# Patient Record
Sex: MALE | Race: OTHER | URBAN - METROPOLITAN AREA
[De-identification: names, ages, dates, MRNs, and addresses within clinical notes are randomized per-mention and may not be internally consistent; named-entity substitution may affect disease eponyms.]

---

## 2017-02-28 ENCOUNTER — IMPORTED ENCOUNTER (OUTPATIENT)
Dept: URBAN - METROPOLITAN AREA CLINIC 1 | Facility: CLINIC | Age: 67
End: 2017-02-28

## 2017-02-28 PROBLEM — H40.013: Noted: 2017-02-28

## 2017-02-28 PROBLEM — H04.121: Noted: 2017-02-28

## 2017-02-28 PROBLEM — H16.142: Noted: 2017-02-28

## 2017-02-28 PROBLEM — H04.123: Noted: 2017-02-28

## 2017-02-28 PROBLEM — H04.122: Noted: 2017-02-28

## 2017-02-28 PROBLEM — H25.813: Noted: 2017-02-28

## 2017-02-28 PROBLEM — H18.413: Noted: 2017-02-28

## 2017-02-28 PROCEDURE — 92015 DETERMINE REFRACTIVE STATE: CPT

## 2017-02-28 PROCEDURE — 92014 COMPRE OPH EXAM EST PT 1/>: CPT

## 2017-02-28 NOTE — PATIENT DISCUSSION
1.  Glaucoma Suspect OU (0.8 0.8) - IOP stable. Past w/u (-) but need updated testing will repeat HVF/OCT next visit. Patient is considered Low Risk. Condition was discussed with patient and patient understands. Will continue to monitor patient for any progression in condition. Patient was advised to call us with any problems questions or concerns. 2.  Dry Eye OU w/ PEK OS - The use/continuation of artificial tears were recommended. 3.  Cataract OU: Observe for now without intervention. The patient was advised to contact us if any change or worsening of vision4. Arcus OU5. H/o HSV Keratitis OS - quite. MRX given. Return for an appointment in 6 months for 10/OCT/HVF with Dr. Claudeen Cobble.

## 2017-08-15 ENCOUNTER — IMPORTED ENCOUNTER (OUTPATIENT)
Dept: URBAN - METROPOLITAN AREA CLINIC 1 | Facility: CLINIC | Age: 67
End: 2017-08-15

## 2017-08-15 PROBLEM — H04.123: Noted: 2017-08-15

## 2017-08-15 PROBLEM — H16.143: Noted: 2017-08-15

## 2017-08-15 NOTE — PATIENT DISCUSSION
1. CLYDE w/ PEK OU OS>OD- Inserted Med Parasol Plug LLL; no complications. Silicone Plug LLL:  Risks benefits and alternatives to placing plug was discussed with patient. Patient understands and would like to proceed. Consent was signed. Post op instructions were discussed/given to patient and patient was advised to call our office if any problems arose. Use ATs QID OU Routinely. 2. Glaucoma Suspect OU (0.8 0.8) - IOP stable. Past w/u (-) but need updated testing will repeat HVF/OCT next visit. Patient is considered Low Risk. 3.  Cataract OU: Observe for now without intervention. The patient was advised to contact us if any change or worsening of vision4. Arcus OU5. H/o HSV Keratitis OS - quite. Return for an appointment in 6 mo 10 dfe glare OCT/ VF 24-2 (dilate per PMG!) with Dr. Caitlin Balbuena.

## 2017-10-11 ENCOUNTER — IMPORTED ENCOUNTER (OUTPATIENT)
Dept: URBAN - METROPOLITAN AREA CLINIC 1 | Facility: CLINIC | Age: 67
End: 2017-10-11

## 2017-10-11 PROBLEM — B00.52: Noted: 2017-10-11

## 2017-10-11 PROBLEM — H04.123: Noted: 2017-10-11

## 2017-10-11 PROCEDURE — 92012 INTRM OPH EXAM EST PATIENT: CPT

## 2017-10-11 NOTE — PATIENT DISCUSSION
1.  HSV keratitis OS - Erx Zirgan MG QHS OS until its gone. 2.  Dry Eyes OU -- REC patient to use PF AT Q1H OS AT TID OD 3. Return for an appointment in 1 week for 10. with Dr. More More.

## 2017-10-16 ENCOUNTER — IMPORTED ENCOUNTER (OUTPATIENT)
Dept: URBAN - METROPOLITAN AREA CLINIC 1 | Facility: CLINIC | Age: 67
End: 2017-10-16

## 2017-10-16 PROBLEM — H40.023: Noted: 2017-10-16

## 2017-10-16 PROBLEM — H16.143: Noted: 2017-10-16

## 2017-10-16 PROBLEM — B00.52: Noted: 2017-10-16

## 2017-10-16 PROBLEM — H04.123: Noted: 2017-10-16

## 2017-10-16 PROBLEM — H25.813: Noted: 2017-10-16

## 2017-10-16 PROCEDURE — 92012 INTRM OPH EXAM EST PATIENT: CPT

## 2017-10-16 NOTE — PATIENT DISCUSSION
1.  HSV Keratitis OS - Increase Zirgan to TID OS x5 days then d/c. Use sunglasses when exposed to UV light. Stress control. 2.  CLYDE w/ PEK OU- (h/o Plug LLL only) Cont ATs TID OU Routinely. 3.  Cataract OU: Observe for now without intervention. The patient was advised to contact us if any change or worsening of vision4. Glaucoma Suspect OU (0.8 0.8) - IOP stable. Past w/u (-) but need updated testing will repeat HVF/OCT next visit. Patient is considered Low Risk.  F/u as scheduled

## 2017-12-07 ENCOUNTER — IMPORTED ENCOUNTER (OUTPATIENT)
Dept: URBAN - METROPOLITAN AREA CLINIC 1 | Facility: CLINIC | Age: 67
End: 2017-12-07

## 2017-12-07 PROBLEM — B00.52: Noted: 2017-12-07

## 2017-12-07 PROCEDURE — 92012 INTRM OPH EXAM EST PATIENT: CPT

## 2017-12-07 NOTE — PATIENT DISCUSSION
1.  Recurrent HSV Keratitis OS - Increase Zirgan to 5x daily OS restart Valtrex 500mg po BID (Dispense 60) . Use sunglasses when exposed to UV light. Stress control. 2.  CLYDE w/ PEK OU- (h/o Plug LLL only) Cont ATs TID OU Routinely. 3.  Cataract OU: Observe for now without intervention. The patient was advised to contact us if any change or worsening of vision4. Glaucoma Suspect OU (0.8 0.8) - IOP stable. Past w/u (-) Patient is considered Low Risk. Return for an appointment in  End of next week 10 (Recheck OS) with Dr. Meron Childress.

## 2017-12-22 ENCOUNTER — IMPORTED ENCOUNTER (OUTPATIENT)
Dept: URBAN - METROPOLITAN AREA CLINIC 1 | Facility: CLINIC | Age: 67
End: 2017-12-22

## 2017-12-22 PROBLEM — B00.52: Noted: 2017-12-22

## 2017-12-22 PROCEDURE — 92012 INTRM OPH EXAM EST PATIENT: CPT

## 2017-12-22 NOTE — PATIENT DISCUSSION
1.  Recurrent HSV Keratitis OS- Improving. Decrease Zirgan OS to TID x 1 week then D/C. Decrease Valtrex 500mg po to QD. Use sunglasses when exposed to UV light. Stress control. Continue tears routinely. 2.  CLYDE w/ PEK OU- (h/o Plug LLL only) Cont ATs TID OU Routinely. 3.  Cataract OU: Observe for now without intervention. The patient was advised to contact us if any change or worsening of vision4. Glaucoma Suspect OU (0.8 0.8) - IOP stable. Past w/u (-) Patient is considered Low Risk. 5. Return as scheduled with Dr. Ricki Bowman.

## 2018-03-12 ENCOUNTER — IMPORTED ENCOUNTER (OUTPATIENT)
Dept: URBAN - METROPOLITAN AREA CLINIC 1 | Facility: CLINIC | Age: 68
End: 2018-03-12

## 2018-03-12 PROBLEM — H25.813: Noted: 2018-03-12

## 2018-03-12 PROCEDURE — 92015 DETERMINE REFRACTIVE STATE: CPT

## 2018-03-12 PROCEDURE — 92014 COMPRE OPH EXAM EST PT 1/>: CPT

## 2018-03-12 NOTE — PATIENT DISCUSSION
1.  Cataract OU:  Pt wishes to hold on Phaco. MRx for glasses given. 2.  Recurrent HSV Keratitis OS- Inactive. Observe. 3.  CLYDE w/ PEK OU- (H/o Plug LLL only) Cont ATs TID OU Routinely. 4.  Glaucoma Suspect OU (0.8 0.8) - IOP stable. Past w/u (-) Patient is considered Low Risk. Return for an appointment in 6 mo 10 VF 24-2 OU with Dr. Bubba aSlas.

## 2018-03-29 ENCOUNTER — IMPORTED ENCOUNTER (OUTPATIENT)
Dept: URBAN - METROPOLITAN AREA CLINIC 1 | Facility: CLINIC | Age: 68
End: 2018-03-29

## 2018-03-29 PROBLEM — H16.143: Noted: 2018-03-29

## 2018-03-29 PROBLEM — H04.123: Noted: 2018-03-29

## 2018-03-29 PROBLEM — B00.52: Noted: 2018-03-29

## 2018-03-29 PROCEDURE — 92012 INTRM OPH EXAM EST PATIENT: CPT

## 2018-03-29 NOTE — PATIENT DISCUSSION
1.  Recurrent HSV OS- Begin Zirgan QID OS (Rx sent to patient's pharmacy) Use PF ATs QID OU Routinely. Will recheck patient in 1 week. 2.  Cataract OU: Observe. 3.  CLYDE w/ PEK OU- (H/o Plug LLL only) Cont ATs TID OU Routinely. 4.  Glaucoma Suspect OU (0.8 0.8) - IOP stable. Past w/u (-) Patient is considered Low Risk. Return for an appointment in 1 week 10 with Dr. Mauro Fletcher.

## 2018-04-09 ENCOUNTER — IMPORTED ENCOUNTER (OUTPATIENT)
Dept: URBAN - METROPOLITAN AREA CLINIC 1 | Facility: CLINIC | Age: 68
End: 2018-04-09

## 2018-04-09 PROBLEM — B00.52: Noted: 2018-04-09

## 2018-04-09 PROCEDURE — 92012 INTRM OPH EXAM EST PATIENT: CPT

## 2018-04-09 NOTE — PATIENT DISCUSSION
1.  Recurrent HSV OS inactive- Ok to d/c Zirgan OS Cont PF ATs QID OU Routinely. 2.  Cataract OU: Observe. 3.  CLYDE w/ PEK OU- (H/o Plug LLL only) Cont ATs TID OU Routinely. 4.  Glaucoma Suspect OU (0.8 0.8) - IOP stable. Past w/u (-) Patient is considered Low Risk. Cancel May appointment Return for an appointment in 6 mo 10 Vf 24-2 OU (no OCT per pmg) with Dr. Blair Henson.

## 2018-06-18 ENCOUNTER — IMPORTED ENCOUNTER (OUTPATIENT)
Dept: URBAN - METROPOLITAN AREA CLINIC 1 | Facility: CLINIC | Age: 68
End: 2018-06-18

## 2018-06-18 PROBLEM — B00.59: Noted: 2018-06-18

## 2018-06-18 PROCEDURE — 92012 INTRM OPH EXAM EST PATIENT: CPT

## 2018-06-18 NOTE — PATIENT DISCUSSION
1.  Recurrent HSV OS: Patient to increase Zirgan OS Q2hrs. (Rx sent to pharm) Cont PF ATs QID OU Routinely2. Return for an appointment for 1wk/10 with Dr. Mauro Fletcher.

## 2018-06-26 ENCOUNTER — IMPORTED ENCOUNTER (OUTPATIENT)
Dept: URBAN - METROPOLITAN AREA CLINIC 1 | Facility: CLINIC | Age: 68
End: 2018-06-26

## 2018-06-26 PROBLEM — B00.52: Noted: 2018-06-26

## 2018-06-26 PROCEDURE — 99213 OFFICE O/P EST LOW 20 MIN: CPT

## 2018-06-26 NOTE — PATIENT DISCUSSION
1.  Recurrent HSV OS: Improving. Patient to decrease Zirgan OS to QID x 3 days then D/C. Cont PF ATs QID OU Routinely. Urged compliance w/ tear use2. Return as scheduled in October with Dr. Willis Cabrera.

## 2018-11-06 ENCOUNTER — IMPORTED ENCOUNTER (OUTPATIENT)
Dept: URBAN - METROPOLITAN AREA CLINIC 1 | Facility: CLINIC | Age: 68
End: 2018-11-06

## 2018-11-06 PROBLEM — B00.52: Noted: 2018-11-06

## 2018-11-06 PROCEDURE — 92012 INTRM OPH EXAM EST PATIENT: CPT

## 2018-11-06 NOTE — PATIENT DISCUSSION
1.  Recurrent HSV OS: Appears inactive. Increase PF ATs to Q2hrs OU Routinely. If symptoms return restart Zirgan 5x daily OS. Urged compliance w/ tear use2. Cataract OU: Observe. 3.  CLYDE w/ PEK OU- (H/o Plug LLL only) Increase PF ATs to Q2hrs OU w/a. 4.  Glaucoma Suspect OU (0.8 0.8) - IOP stable. Past w/u (-) Patient is considered Low Risk. F/u as scheduled

## 2019-01-31 ENCOUNTER — IMPORTED ENCOUNTER (OUTPATIENT)
Dept: URBAN - METROPOLITAN AREA CLINIC 1 | Facility: CLINIC | Age: 69
End: 2019-01-31

## 2019-01-31 PROBLEM — B00.52: Noted: 2019-01-31

## 2019-01-31 PROBLEM — H25.813: Noted: 2019-01-31

## 2019-01-31 PROBLEM — H40.013: Noted: 2019-01-31

## 2019-01-31 PROBLEM — H04.123: Noted: 2019-01-31

## 2019-01-31 PROBLEM — H16.143: Noted: 2019-01-31

## 2019-01-31 PROCEDURE — 92015 DETERMINE REFRACTIVE STATE: CPT

## 2019-01-31 PROCEDURE — 92012 INTRM OPH EXAM EST PATIENT: CPT

## 2019-01-31 PROCEDURE — 92083 EXTENDED VISUAL FIELD XM: CPT

## 2019-01-31 PROCEDURE — 92133 CPTRZD OPH DX IMG PST SGM ON: CPT

## 2019-01-31 NOTE — PATIENT DISCUSSION
1.  Cataract OU:  Visually Significant secondary to glare discussed the risks benefits alternatives and limitations of cataract surgery. The patient stated a full understanding and a desire to proceed with the procedure. The patient will need to return for preop appointment with cataract measurements and to have any additional questions answered and start pre-operative eye drops as directed. Phaco PCL OD then OSOtherwise follow-up in 6 months for a 30/OCT/glare2. Glaucoma Suspect OU (0.8 0.8) - IOP stable. HVF defects noted OU. Patient is considered High Risk. Repeat OCT after phaco. Condition was discussed with patient and patient understands. Will continue to monitor patient for any progression in condition. Patient was advised to call us with any problems questions or concerns. 3.  CLYDE w/ PEK OU- (H/o Plug LLL only)- Continue PF ATs Q2hrs OU w/a. 4.  Recurrent HSV OS: Appears inactive. Continue PF ATs Q2hrs OU Routinely. If symptoms return restart Zirgan 5x daily OS. Urged compliance w/ tear use5. Return for an appointment for [facility] H&P with Dr. Saqib Emerson.

## 2019-11-26 ENCOUNTER — IMPORTED ENCOUNTER (OUTPATIENT)
Dept: URBAN - METROPOLITAN AREA CLINIC 1 | Facility: CLINIC | Age: 69
End: 2019-11-26

## 2019-11-26 PROBLEM — H25.813: Noted: 2019-11-26

## 2019-11-26 PROBLEM — H40.1232: Noted: 2019-11-26

## 2019-11-26 PROCEDURE — 92133 CPTRZD OPH DX IMG PST SGM ON: CPT

## 2019-11-26 PROCEDURE — 92012 INTRM OPH EXAM EST PATIENT: CPT

## 2019-11-26 NOTE — PATIENT DISCUSSION
1.  Moderate Low Tension Glaucoma OU (CD 0.8 OU) Fm Hx. OCT shows moderate thinning OU; however patient wishes to schedule Phaco OU. Will hold until after Phaco before beginning COAG gtt treatment. Re-evaluate after Phaco. 2.  Cataract OU:  Visually Significant secondary to glare discussed the risks benefits alternatives and limitations of cataract surgery. The patient stated a full understanding and a desire to proceed with the procedure. The patient will need to return for preop appointment with cataract measurements and to have any additional questions answered and start pre-operative eye drops as directed. Guarded visual prognosis OS secondary to h/o HZV OS. Phaco PCL OS then OD. (Otherwise follow-up 3 mo 10) 3. CLYDE w/ PEK OU- (H/o Plug LLL only)- Continue PF ATs Q2hrs OU w/a. 4.  Recurrent HSV OS: Appears inactive. Continue PF ATs Q2hrs OU Routinely. If symptoms return restart Zirgan 5x daily OS. Urged compliance w/ tear useSchedule Phaco OS then OD. Return for an appointment with Dr. Jessy Taveras for AS/HP.  **Do MRX @ [facility]**

## 2020-01-06 ENCOUNTER — IMPORTED ENCOUNTER (OUTPATIENT)
Dept: URBAN - METROPOLITAN AREA CLINIC 1 | Facility: CLINIC | Age: 70
End: 2020-01-06

## 2020-01-06 PROBLEM — B00.52: Noted: 2020-01-06

## 2020-01-06 PROCEDURE — 92012 INTRM OPH EXAM EST PATIENT: CPT

## 2020-01-06 NOTE — PATIENT DISCUSSION
1.  Recurrent HSV OS: Active today: Begin Zirgan 5x daily OS (Sample given rx sent to patient's pharmacy) begin Valtrex 500mg po BID (Rx sent to patient's pharmacy). Will recheck on 1/10 and cancel upcoming Phaco OU till stable OS. 2.  Moderate Low Tension Glaucoma OU (CD 0.8 OU) Fm Hx. Will hold until after Phaco before beginning COAG gtt treatment. Re-evaluate after Phaco. 3.  Cataract OU:  Visually Significant secondary to glare; will cancel Phaco OU and reschedule once HSV OS quiet. 4.  CLYDE w/ PEK OU- (H/o Plug LLL only)- Continue PF ATs Q2hrs OU w/a. F/u as scheduled on 1/10- do [facility] IOP check OU next visit.

## 2020-01-10 ENCOUNTER — IMPORTED ENCOUNTER (OUTPATIENT)
Dept: URBAN - METROPOLITAN AREA CLINIC 1 | Facility: CLINIC | Age: 70
End: 2020-01-10

## 2020-01-10 PROBLEM — B00.52: Noted: 2020-01-10

## 2020-01-10 PROCEDURE — 92012 INTRM OPH EXAM EST PATIENT: CPT

## 2020-01-10 NOTE — PATIENT DISCUSSION
1.  Recurrent HSV OS: improving: Use Zirgan 5x daily OS till samples out then switch to Viroptic QID OS (Rx sent to patient's pharmacy). *[employer] does not cover [address]. Will recheck patient in 1 week. 2.  Moderate Low Tension Glaucoma OU (CD 0.8 OU) Fm Hx. Will hold until after Phaco before beginning COAG gtt treatment. Re-evaluate after Phaco. 3.  Cataract OU:  Visually Significant secondary to glare; will cancel Phaco OU and reschedule once HSV OS quiet. 4.  CLYDE w/ PEK OU- (H/o Plug LLL only)- Continue PF ATs Q2hrs OU w/a. Return for an appointment next week for 10 with Dr. Melonie Walton.

## 2020-01-17 ENCOUNTER — IMPORTED ENCOUNTER (OUTPATIENT)
Dept: URBAN - METROPOLITAN AREA CLINIC 1 | Facility: CLINIC | Age: 70
End: 2020-01-17

## 2020-01-17 PROCEDURE — 99213 OFFICE O/P EST LOW 20 MIN: CPT

## 2020-01-17 NOTE — PATIENT DISCUSSION
Recurrent HSV OS: Inactive. DC Viroptic. Cont Valtrex PO BID until after Phaco. 2.  Moderate Low Tension Glaucoma OU (CD 0.8 OU) Fm Hx. Will hold until after Phaco before beginning COAG gtt treatment. Re-evaluate after Phaco. 3.  Cataract OU:  Visually Significant secondary to glare; will cancel Phaco OU and reschedule once HSV OS quiet. 4.  CLYDE w/ PEK OU- (H/o Plug LLL only)- Continue PF ATs Q2hrs OU w/a. Return for an appointment for Ascan/H and P. with Dr. Blair Henson.

## 2020-03-27 ENCOUNTER — IMPORTED ENCOUNTER (OUTPATIENT)
Dept: URBAN - METROPOLITAN AREA CLINIC 1 | Facility: CLINIC | Age: 70
End: 2020-03-27

## 2020-03-27 PROBLEM — H16.143: Noted: 2020-03-27

## 2020-03-27 PROBLEM — B00.52: Noted: 2020-03-27

## 2020-03-27 PROBLEM — H04.123: Noted: 2020-03-27

## 2020-03-27 PROCEDURE — 92012 INTRM OPH EXAM EST PATIENT: CPT

## 2020-03-27 NOTE — PATIENT DISCUSSION
1.  CLYDE w/ PEK OU --  (H/o Plug LLL only)- Continue PF ATs Q2hrs OU w/a.2. Recurrent HSV OS -- Inactive. DC Viroptic. Cont Valtrex PO QD (erx'd) 2. Moderate Low Tension Glaucoma OU -- (CD 0.8 OU) Fm Hx. Will hold until after Phaco before beginning COAG gtt treatment. Plan to re-evaluate after Phaco. 3.  Cataract OU -- Visually Significant secondary to glare; will reevaluate in 3 months. Return for an appointment for 3 months for a 30/HVF with Dr. Gianna Feng.

## 2020-07-07 ENCOUNTER — IMPORTED ENCOUNTER (OUTPATIENT)
Dept: URBAN - METROPOLITAN AREA CLINIC 1 | Facility: CLINIC | Age: 70
End: 2020-07-07

## 2020-07-07 PROBLEM — H25.813: Noted: 2020-07-07

## 2020-07-07 PROBLEM — H16.143: Noted: 2020-07-07

## 2020-07-07 PROBLEM — H40.1232: Noted: 2020-07-07

## 2020-07-07 PROBLEM — H04.123: Noted: 2020-07-07

## 2020-07-07 PROCEDURE — 92083 EXTENDED VISUAL FIELD XM: CPT

## 2020-07-07 PROCEDURE — 92014 COMPRE OPH EXAM EST PT 1/>: CPT

## 2020-07-07 NOTE — PATIENT DISCUSSION
1.  Moderate Low Tension Glaucoma OU (CD 0.80 OU) - HVF shows non specific defects OD dense arcuate OS vs artifact due to k scars. Begin Lumigan QHS OD only (sample given). Condition was discussed with patient and patient understands. Will continue to monitor patient for any progression in condition. Patient was advised to call us with any problems questions or concerns. 2.  Cataract OU: Observe for now without intervention. The patient was advised to contact us if any change or worsening of vision3. CLYDE w/ PEK OU- (LLL plug in place) Recommend ATs QID OU routinely 4. Central Corneal Scar OS 5. Recurrent HSV OS - Inactive Patient deferred Manifest Rx today. Return for an appointment in 3-4 weeks 10 with Dr. Hawa Henao.

## 2020-11-02 ENCOUNTER — IMPORTED ENCOUNTER (OUTPATIENT)
Dept: URBAN - METROPOLITAN AREA CLINIC 1 | Facility: CLINIC | Age: 70
End: 2020-11-02

## 2020-11-02 PROBLEM — H40.1232: Noted: 2020-11-02

## 2020-11-02 PROCEDURE — 92012 INTRM OPH EXAM EST PATIENT: CPT

## 2020-11-02 NOTE — PATIENT DISCUSSION
Begin Lumigan QHS OD only (sample given). Condition was discussed with patient and patient understands. Will continue to monitor patient for any progression in condition. Patient was advised to call us with any problems questions or concerns. 2.  Cataract OU: Observe for now without intervention. The patient was advised to contact us if any change or worsening of vision3. CLYDE w/ PEK OU- (LLL plug in place) Recommend ATs QID OU routinely 4. Central Corneal Scar OS 5.   Recurrent HSV OS - Inactive

## 2020-11-02 NOTE — PATIENT DISCUSSION
1.  Moderate Low Tension Glaucoma OU (CD 0.80 OU) - Begin Xelpros QHS OU (sample given). Advsied to be compliant with gtts. Family hx. Condition was discussed with patient and patient understands. Will continue to monitor patient for any progression in condition. Patient was advised to call us with any problems questions or concerns. 2.  Cataract OU: Observe for now without intervention. The patient was advised to contact us if any change or worsening of vision3. CLYDE w/ PEK OU- (LLL plug in place) Recommend ATs QID OU routinely 4. Central Corneal Scar OS 5. H/o Recurrent HSV OS - InactiveReturn for an appointment in 3 weeks 10/DFE/glare/MRX with Dr. Krysta De Santiago.

## 2020-11-25 ENCOUNTER — IMPORTED ENCOUNTER (OUTPATIENT)
Dept: URBAN - METROPOLITAN AREA CLINIC 1 | Facility: CLINIC | Age: 70
End: 2020-11-25

## 2020-11-25 PROBLEM — H40.1232: Noted: 2020-11-25

## 2020-11-25 PROBLEM — H25.813: Noted: 2020-11-25

## 2020-11-25 PROCEDURE — 92012 INTRM OPH EXAM EST PATIENT: CPT

## 2020-11-25 PROCEDURE — 92015 DETERMINE REFRACTIVE STATE: CPT

## 2020-11-25 NOTE — PATIENT DISCUSSION
Moderate Low Tension Glaucoma OU - -Patient to continue with current gtt regimen. Condition was discussed with patient and patient understands. Will continue to monitor patient for any progression in condition. Patient was advised to call us with any problems questions or concerns.

## 2020-11-25 NOTE — PATIENT DISCUSSION
1.  Cataract OU --  Visually Significant secondary to Glare OU discussed the risks benefits alternatives and limitations of cataract surgery. The patient stated a full understanding and a desire to proceed with the procedure. The patient will need to return for preop appointment with cataract measurements and to have any additional questions answered and start pre-operative eye drops as directed. Phaco PCL OS then OD Otherwise follow-up2. Moderate Low Tension Glaucoma OU (CD 0.80 OU) - Good response IOP on Xelpros. Continue Xelpros QHS OU (Erx'd). Advsied to be compliant with gtts. Family hx. Condition was discussed with patient and patient understands. Will continue to monitor patient for any progression in condition. Patient was advised to call us with any problems questions or concerns. 3.  CLYDE w/ PEK OU- (LLL plug in place) Recommend ATs QID OU routinely 4. Central Corneal Scar OS 5. H/o Recurrent HSV OS - InactiveReturn for an appointment in Ascan/HP with Dr. Henna Yun.

## 2021-01-04 ENCOUNTER — IMPORTED ENCOUNTER (OUTPATIENT)
Dept: URBAN - METROPOLITAN AREA CLINIC 1 | Facility: CLINIC | Age: 71
End: 2021-01-04

## 2021-01-04 PROBLEM — H25.812: Noted: 2021-01-04

## 2021-01-04 PROCEDURE — 92136 OPHTHALMIC BIOMETRY: CPT

## 2021-01-04 NOTE — PATIENT DISCUSSION
1. Cataract OS --  Visually Significant discussed the risks benefits alternatives and limitations of cataract surgery. The patient stated a full understanding and a desire to proceed with the procedure. Discussed with patient if PO Gtts are more than $120 for all three combined when filling at their Pharmacy please call our office to request generic substitutions. Pt came to pre-op appointment today alone and Lifestyle Questionnaire Completed. Pt understands they will need glasses post-op to achieve their best corrected vision. *Guarded visual prognosis secondary to Central K scar OS.  Phaco PCL

## 2021-04-30 ENCOUNTER — IMPORTED ENCOUNTER (OUTPATIENT)
Dept: URBAN - METROPOLITAN AREA CLINIC 1 | Facility: CLINIC | Age: 71
End: 2021-04-30

## 2021-04-30 PROBLEM — H04.123: Noted: 2021-04-30

## 2021-04-30 PROBLEM — H16.143: Noted: 2021-04-30

## 2021-04-30 PROCEDURE — 99213 OFFICE O/P EST LOW 20 MIN: CPT

## 2021-04-30 NOTE — PATIENT DISCUSSION
1.  CLYDE w/ PEK OS>OD - (LLL plug in place) Recommend PF ATs QID-Q2H OU routinely 2. Cataract OU - RTC as scheduled for Phaco OU  3. Moderate Low Tension Glaucoma OU (CD 0.80 OU) - Restart Xelpros QHS OU (sample given erx Latanoprost). Advised to be compliant with gtts. Family hx. 4. Central Corneal Scar OS 5. H/o Recurrent HSV OS - Restart Zirgan [id number] OS (sample given erx). Restart Valtrex 500mg PO BID x 1 month disp#60 (erx). InactiveReturn for an appointment for Return as scheduled 10/HP with Dr. Bubba Salas.

## 2021-04-30 NOTE — PATIENT DISCUSSION
(LLL plug in place) Recommend ATs QID OU routinely 2. Cataract OU - Observe 3. Moderate Low Tension Glaucoma OU (CD 0.80 OU) - Restart Xelpros QHS OU (sample given erx Latanoprost). Advised to be compliant with gtts. Family hx. 4. Central Corneal Scar OS 5. H/o Recurrent HSV OS - Restart Zirgan V6hClaos OS (sample given erx). Restart Valtrex 500mg PO BID x 1 month disp#60 (erx). InactiveReturn for an appointment for Return as scheduled 10/HP with Dr. Saqib Emerson.

## 2021-05-03 ENCOUNTER — IMPORTED ENCOUNTER (OUTPATIENT)
Dept: URBAN - METROPOLITAN AREA CLINIC 1 | Facility: CLINIC | Age: 71
End: 2021-05-03

## 2021-05-03 PROBLEM — B00.52: Noted: 2021-05-03

## 2021-05-03 PROBLEM — H25.812: Noted: 2021-05-03

## 2021-05-03 PROCEDURE — 92136 OPHTHALMIC BIOMETRY: CPT

## 2021-05-03 NOTE — PATIENT DISCUSSION
1. Cataract OU --  Visually Significant discussed the risks benefits alternatives and limitations of cataract surgery. The patient stated a full understanding and a desire to proceed with the procedure. Discussed with patient if PO Gtts are more than $120 for all three combined when filling at their Pharmacy please call our office to request generic substitutions. Pt came to pre-op appointment today alone and Lifestyle Questionnaire Completed. Pt understands they will need glasses post-op to achieve their best corrected vision. *Guarded visual prognosis secondary to Central K scar OS. Phaco PCL OS then ODPhaco PCL OS2. Recurrent HSV OS - Improved. D/c Zirgan. Continue Valtrex 500mg PO BID as directed. 3.  CLYDE w/ PEK OS>OD - (LLL plug in place) Recommend PF ATs QID-Q2H OU routinely 4. Moderate Low Tension Glaucoma OU (CD 0.80 OU) - cont Xelpros QHS OU (sample given erx Latanoprost). Advised to be compliant with gtts. Family hx.5. Central Corneal Scar OS Return for an appointment for Return as scheduled  with Dr. Jessy Taveras.

## 2021-05-12 ENCOUNTER — IMPORTED ENCOUNTER (OUTPATIENT)
Dept: URBAN - METROPOLITAN AREA CLINIC 1 | Facility: CLINIC | Age: 71
End: 2021-05-12

## 2021-05-13 ENCOUNTER — IMPORTED ENCOUNTER (OUTPATIENT)
Dept: URBAN - METROPOLITAN AREA CLINIC 1 | Facility: CLINIC | Age: 71
End: 2021-05-13

## 2021-05-13 PROBLEM — Z96.1: Noted: 2021-05-13

## 2021-05-13 PROCEDURE — 99024 POSTOP FOLLOW-UP VISIT: CPT

## 2021-05-13 NOTE — PATIENT DISCUSSION
POD#1 CE/IOL Standard OS doing well. *H/o HSV OS (Cont Valtrex PO as directed). Use Lotemax BID OS Prolensa Qdaily OS Ocuflox TID OS : Use all three gtts through completion of PO gtt chart regimen/ Per our instructions given to patient. Post op Warnings Reiterated Continue Latanprost QHS OU.  RTC as scheduled

## 2021-05-20 ENCOUNTER — IMPORTED ENCOUNTER (OUTPATIENT)
Dept: URBAN - METROPOLITAN AREA CLINIC 1 | Facility: CLINIC | Age: 71
End: 2021-05-20

## 2021-05-20 PROBLEM — H25.811: Noted: 2021-05-20

## 2021-05-20 PROCEDURE — 92136 OPHTHALMIC BIOMETRY: CPT

## 2021-05-20 NOTE — PATIENT DISCUSSION
1.  Cataract OD -- Visually Significant secondary to glare discussed the risks benefits alternatives and limitations of cataract surgery. The patient stated a full understanding and a desire to proceed with the procedure. Discussed with patient if PO Gtts are more than $120 for all three combined when filling at their Pharmacy please call our office to request generic substitutions. Phaco PCL OD 2. POW#3  CE/IOL Standard OS doing well. *H/o HSV w/ Central K Scarring. (Cont Valtrex PO as directed). Use Lotemax BID OS and Prolensa Qdaily OS: Use gtts through completion of PO gtt regimen. Cont Latanoprost QHS OU.  F/u as scheduled 2nd eye

## 2021-05-26 ENCOUNTER — IMPORTED ENCOUNTER (OUTPATIENT)
Dept: URBAN - METROPOLITAN AREA CLINIC 1 | Facility: CLINIC | Age: 71
End: 2021-05-26

## 2021-05-27 ENCOUNTER — IMPORTED ENCOUNTER (OUTPATIENT)
Dept: URBAN - METROPOLITAN AREA CLINIC 1 | Facility: CLINIC | Age: 71
End: 2021-05-27

## 2021-05-27 PROBLEM — Z96.1: Noted: 2021-05-27

## 2021-05-27 PROCEDURE — 99024 POSTOP FOLLOW-UP VISIT: CPT

## 2021-05-27 NOTE — PATIENT DISCUSSION
1. POD#1 Phaco/ PCL Toric OD- doing well. Dextenza upper. Use Lotemax BID OD Prolensa Qdaily OD Ocuflox TID OD : Use all three gtts through completion of PO gtt chart regimen/ Per our instructions given. Post op Warnings Reiterated 2. POW#3 Phaco/ PCL Standard OS- doing well. *H/o HSV OS w/ Secondary K scar. Use Lotemax BID OS and Prolensa Qdaily OS: Use gtts through completion of PO gtt regimen. Continue Latanoprost QHS OU. Cont PO Valtrex.  RTC as scheduled

## 2021-06-19 ENCOUNTER — IMPORTED ENCOUNTER (OUTPATIENT)
Dept: URBAN - METROPOLITAN AREA CLINIC 1 | Facility: CLINIC | Age: 71
End: 2021-06-19

## 2021-06-19 PROBLEM — H20.9: Noted: 2021-06-19

## 2021-06-19 PROCEDURE — 99024 POSTOP FOLLOW-UP VISIT: CPT

## 2021-06-19 NOTE — PATIENT DISCUSSION
1. Rebound Iritis OS -- Restart gtts Lotemax  BID OS and Prolensa Qdaily OS2. H/o HSV OS -- Cont Zirgan and Cont PO Valtrex. Continue Latanoprost QHS OU.

## 2021-06-25 ENCOUNTER — IMPORTED ENCOUNTER (OUTPATIENT)
Dept: URBAN - METROPOLITAN AREA CLINIC 1 | Facility: CLINIC | Age: 71
End: 2021-06-25

## 2021-06-25 PROBLEM — Z96.1: Noted: 2021-06-25

## 2021-06-25 PROCEDURE — 99024 POSTOP FOLLOW-UP VISIT: CPT

## 2021-06-25 NOTE — PATIENT DISCUSSION
POM#1 CE/IOL OU (Toric OD Standard OS) doing well. *Dextenza in place   *H/o HSV OS  AC Quiet OU Continue Lotemax QD OS and Prolensa QD OS (sample of each given use until gone) TONIE Mar Recommend PF ATs QID OU routinelyContinue Latanoprost QHS OU  MRX for glasses given. Return for an appointment in 3 months 30/OCT/glare with Dr. Hawa Henao.

## 2021-07-22 ENCOUNTER — IMPORTED ENCOUNTER (OUTPATIENT)
Dept: URBAN - METROPOLITAN AREA CLINIC 1 | Facility: CLINIC | Age: 71
End: 2021-07-22

## 2021-07-22 PROBLEM — H16.143: Noted: 2021-07-22

## 2021-07-22 PROBLEM — H04.123: Noted: 2021-07-22

## 2021-07-22 PROCEDURE — 99213 OFFICE O/P EST LOW 20 MIN: CPT

## 2021-07-22 NOTE — PATIENT DISCUSSION
1.  CLYDE w/ increased PEK OU (RLL [place] open/LLL Plug in place) -- Begin PF ATs to Q1-2Hrs OU for 2 weeks then may decrease to QID OU routinely (Samples of Refresh Anthony 3's given). Begin OTC Cesar QHS OU (Handout given). May cont Lumify QD OU PRN redness. *Consider Xiidra/Restasis without improvement. 2.  Moderate Low Tension Glaucoma OU -- (CD 0.80 OU) IOP stable OU. Cont Latanoprost QHS OU. 3.  PCO OU (Posterior Capsular Opacification) -- Observe. 4.  Central Corneal Scar OS -- Stable. Observe. 5.  Pseudophakia OU (Toric OD/Standard OS)  6. H/o Recurrent HSV OS -- Inactive. Quiet today. Return for an appointment as scheduled (11.30.2021 - 30/OCT/Glare) with Dr. Emani Arce.

## 2021-09-22 ENCOUNTER — IMPORTED ENCOUNTER (OUTPATIENT)
Dept: URBAN - METROPOLITAN AREA CLINIC 1 | Facility: CLINIC | Age: 71
End: 2021-09-22

## 2021-09-22 PROBLEM — H40.1232: Noted: 2021-09-22

## 2021-09-22 PROBLEM — H16.143: Noted: 2021-09-22

## 2021-09-22 PROBLEM — Z96.1: Noted: 2021-09-22

## 2021-09-22 PROBLEM — H04.123: Noted: 2021-09-22

## 2021-09-22 PROBLEM — H26.492: Noted: 2021-09-22

## 2021-09-22 PROBLEM — H18.413: Noted: 2021-09-22

## 2021-09-22 PROBLEM — H17.12: Noted: 2021-09-22

## 2021-09-22 PROCEDURE — 92012 INTRM OPH EXAM EST PATIENT: CPT

## 2021-09-22 NOTE — PATIENT DISCUSSION
PCO OU- Visually Significant *secondary to glare*OS; recommend YAG PC OS. Pt wishes to wait to see Dr Uli Temple in November to discuss with him.

## 2021-09-22 NOTE — PATIENT DISCUSSION
1.  Moderate Low Tension Glaucoma OU - -Patient to continue with current gtt regimen(Latanoprost OU hs). Condition was discussed with patient and patient understands. Will continue to monitor patient for any progression in condition. Patient was advised to call us with any problems questions or concerns. 2.  PCO OU- Visually Significant *secondary to glare*OS; recommend YAG PC OS. Pt wishes to wait to see Dr Corry Vallejo in November to discuss with him. 3. Central Corneal Scar OS 4. CLYDE w/ PEK OU-The use/continuation of artificial tears were recommended. 5.  Pseudophakia OU 6. Judi Hamlin. Return for an appointment for Return as scheduled with Dr Corry Vallejo with Dr. Lexy Hu.

## 2021-09-22 NOTE — PATIENT DISCUSSION
Moderate Low Tension Glaucoma OU - -Patient to continue with current gtt regimen(Latanoprost OU hs). Condition was discussed with patient and patient understands. Will continue to monitor patient for any progression in condition. Patient was advised to call us with any problems questions or concerns.

## 2021-11-30 ENCOUNTER — IMPORTED ENCOUNTER (OUTPATIENT)
Dept: URBAN - METROPOLITAN AREA CLINIC 1 | Facility: CLINIC | Age: 71
End: 2021-11-30

## 2021-11-30 PROBLEM — H26.493: Noted: 2021-11-30

## 2021-11-30 PROBLEM — H40.1232: Noted: 2021-11-30

## 2021-11-30 PROBLEM — H26.492: Noted: 2021-11-30

## 2021-11-30 PROCEDURE — 92133 CPTRZD OPH DX IMG PST SGM ON: CPT

## 2021-11-30 PROCEDURE — 92015 DETERMINE REFRACTIVE STATE: CPT

## 2021-11-30 PROCEDURE — 92012 INTRM OPH EXAM EST PATIENT: CPT

## 2021-11-30 NOTE — PATIENT DISCUSSION
1.  Moderate Low Tension Glaucoma OU -- (CD 0.80 OU) IOP stable OU. No progression by OCT. Cont Latanoprost QHS OU. Patient to continue with current gtt regimen. Condition was discussed with patient and patient understands. Will continue to monitor patient for any progression in condition. Patient was advised to call us with any problems questions or concerns. 2.  PCO OU -- Visually Significant *secondary to glare*; schedule YAG Cap OD then OS. Pros cons risks and benefits. 3.  CLYDE w/ increased PEK OU (RLL [place] open/LLL Plug in place) -- Continue PF ATs to Q1-2Hrs OU for 2 weeks then may decrease to QID OU routinely (Samples of Refresh Anthony 3's given). Begin OTC Cesar QHS OU (Handout given). May cont Lumify QD OU PRN redness. *Consider Xiidra/Restasis without improvement. 4.  Central Corneal Scar OS -- Stable. Observe. 5.  Pseudophakia OU (Toric OD/Standard OS)  6. H/o Recurrent HSV OS -- Inactive. Quiet today.

## 2022-02-04 ENCOUNTER — IMPORTED ENCOUNTER (OUTPATIENT)
Dept: URBAN - METROPOLITAN AREA CLINIC 1 | Facility: CLINIC | Age: 72
End: 2022-02-04

## 2022-02-04 PROBLEM — H26.491: Noted: 2022-02-04

## 2022-02-04 PROCEDURE — 66821 AFTER CATARACT LASER SURGERY: CPT

## 2022-02-04 NOTE — PATIENT DISCUSSION
YAG CAP OD: (Consent signed and scanned into attachments) 1 gtt Prolensa applied. The purpose and nature of the procedure possible alternative methods of treatment the risks involved and the possibility of complications were discussed with patient. The Patient wishes to proceed and the consent was signed. The laser was then performed under topical anesthesia with no complications. Post op instructions were given to patient as well as a follow-up appointment. Patient was advised to call our office if any questions or concerns. Return for an appointment as scheduled with Dr. Meron Childress.

## 2022-03-04 ENCOUNTER — CLINIC PROCEDURE ONLY (OUTPATIENT)
Dept: URBAN - METROPOLITAN AREA CLINIC 1 | Facility: CLINIC | Age: 72
End: 2022-03-04

## 2022-03-04 DIAGNOSIS — H26.492: ICD-10-CM

## 2022-03-04 DIAGNOSIS — Z96.1: ICD-10-CM

## 2022-03-04 PROCEDURE — 66821 AFTER CATARACT LASER SURGERY: CPT

## 2022-03-04 ASSESSMENT — VISUAL ACUITY
OD_BAT: 20/40
OS_BAT: 20/50
OS_CC: 20/60
OD_CC: 20/25

## 2022-03-04 NOTE — PROCEDURE NOTE: CLINICAL
PROCEDURE NOTE: YAG Capsulotomy OS. Diagnosis: Posterior Capsular Opacity. Anesthesia: Topical. The purpose and nature of the procedure, possible alternative methods of treatment, the risks involved and the possibility of complications were discussed with patient. The Patient wishes to proceed and the consent was signed. 1 gtt Prolensa applied. The laser was then performed under topical anesthesia with no complications. Post op instructions were given to patient as well as a follow-up appointment. Patient was advised to call our office if any questions or concerns. Laura Carlson

## 2022-04-02 ASSESSMENT — VISUAL ACUITY
OS_SC: 20/60+1
OD_GLARE: 20/200
OS_GLARE: 20/200
OS_SC: 20/60
OD_GLARE: 20/25
OS_SC: 20/30-2
OS_SC: 20/50
OD_CC: 20/20
OS_GLARE: 20/200
OD_GLARE: 20/200
OD_CC: J1
OD_SC: 20/30
OD_SC: 20/30
OS_SC: 20/40
OS_CC: 20/60
OD_CC: 20/40
OD_SC: 20/25
OS_CC: J3
OD_GLARE: 20/200
OD_SC: 20/30-1
OS_GLARE: 20/100
OD_CC: J1
OS_CC: 20/60-1
OS_SC: 20/30-1
OD_SC: 20/30
OD_SC: J4
OD_SC: 20/20-1
OD_SC: 20/25
OS_SC: 20/40+1
OD_SC: 20/40
OS_PH: CC 20/40 -2
OD_CC: J2
OS_SC: 20/40
OS_CC: 20/70
OD_SC: 20/30-2
OS_SC: 20/30
OD_CC: 20/60
OS_SC: 20/50
OS_SC: 20/40-1
OD_CC: J2
OD_CC: 20/20-2
OS_SC: 20/30-2
OD_GLARE: 20/200
OS_GLARE: 20/200
OS_SC: 20/25-2
OS_CC: J1
OS_SC: 20/30
OS_CC: J1
OS_GLARE: 20/100
OS_SC: 20/40
OS_GLARE: 20/50
OD_CC: J2
OS_SC: 20/25-2
OS_GLARE: 20/60
OS_PH: SC 20/40
OD_SC: 20/20-1
OS_CC: 20/40
OS_GLARE: 20/150
OD_SC: 20/25
OD_SC: 20/30
OD_SC: 20/30
OS_SC: 20/40-1
OS_CC: 20/100
OS_SC: 20/50
OS_GLARE: 20/200
OS_GLARE: 20/50
OD_SC: 20/20-2
OD_SC: 20/30
OD_GLARE: 20/40-1
OS_SC: 20/50
OS_CC: J1
OS_SC: 20/20
OS_CC: 20/20-2
OD_CC: 20/40
OD_SC: 20/25
OD_GLARE: 20/60
OS_CC: J2
OS_SC: 20/30-2
OS_SC: 20/30
OD_SC: 20/30-1
OD_SC: 20/40
OD_SC: 20/20
OD_CC: 20/80+2
OD_GLARE: 20/60
OD_GLARE: 20/80
OS_SC: 20/25
OD_SC: 20/25-1
OS_CC: J2
OS_SC: 20/25
OS_SC: 20/30
OD_SC: 20/25
OD_CC: J1
OS_SC: 20/40
OS_CC: 20/70-2
OD_SC: 20/30
OD_GLARE: 20/70
OD_SC: 20/25-2
OD_SC: 20/20-2
OD_SC: 20/25
OS_CC: 20/100
OS_SC: J3
OD_SC: 20/30

## 2022-04-02 ASSESSMENT — TONOMETRY
OD_IOP_MMHG: 15
OD_IOP_MMHG: 14
OS_IOP_MMHG: 13
OD_IOP_MMHG: 12
OS_IOP_MMHG: 20
OD_IOP_MMHG: 15
OD_IOP_MMHG: 17
OS_IOP_MMHG: 16
OS_IOP_MMHG: 16
OS_IOP_MMHG: 12
OS_IOP_MMHG: 14
OS_IOP_MMHG: 15
OD_IOP_MMHG: 12
OD_IOP_MMHG: 13
OS_IOP_MMHG: 14
OS_IOP_MMHG: 13
OS_IOP_MMHG: 18
OS_IOP_MMHG: 15
OS_IOP_MMHG: 10
OD_IOP_MMHG: 19
OS_IOP_MMHG: 13
OD_IOP_MMHG: 12
OD_IOP_MMHG: 10
OD_IOP_MMHG: 10
OD_IOP_MMHG: 15
OS_IOP_MMHG: 15
OD_IOP_MMHG: 13
OD_IOP_MMHG: 14
OS_IOP_MMHG: 19
OD_IOP_MMHG: 13
OS_IOP_MMHG: 16
OS_IOP_MMHG: 18
OD_IOP_MMHG: 19
OS_IOP_MMHG: 16
OS_IOP_MMHG: 18
OD_IOP_MMHG: 16
OS_IOP_MMHG: 13
OS_IOP_MMHG: 16
OD_IOP_MMHG: 12
OS_IOP_MMHG: 14
OD_IOP_MMHG: 14
OD_IOP_MMHG: 14
OS_IOP_MMHG: 15
OS_IOP_MMHG: 14
OD_IOP_MMHG: 13
OS_IOP_MMHG: 11
OS_IOP_MMHG: 12
OS_IOP_MMHG: 11
OS_IOP_MMHG: 15
OD_IOP_MMHG: 11
OD_IOP_MMHG: 14
OD_IOP_MMHG: 16
OD_IOP_MMHG: 11
OD_IOP_MMHG: 15

## 2022-04-02 ASSESSMENT — KERATOMETRY
OS_AXISANGLE2_DEGREES: 100
OD_AXISANGLE_DEGREES: 136
OS_K2POWER_DIOPTERS: 38.05
OD_K1POWER_DIOPTERS: 39.25
OS_AXISANGLE_DEGREES: 010
OD_K1POWER_DIOPTERS: 38.75
OD_AXISANGLE_DEGREES: 003
OS_K2POWER_DIOPTERS: 44.50
OD_AXISANGLE2_DEGREES: 046
OS_AXISANGLE2_DEGREES: 053
OS_K1POWER_DIOPTERS: 43.00
OD_AXISANGLE2_DEGREES: 093
OS_AXISANGLE_DEGREES: 143
OS_K1POWER_DIOPTERS: 39.75
OD_K2POWER_DIOPTERS: 38.50
OD_K2POWER_DIOPTERS: 39.25

## 2022-08-19 ENCOUNTER — EMERGENCY VISIT (OUTPATIENT)
Dept: URBAN - METROPOLITAN AREA CLINIC 1 | Facility: CLINIC | Age: 72
End: 2022-08-19

## 2022-08-19 ENCOUNTER — FOLLOW UP (OUTPATIENT)
Dept: URBAN - METROPOLITAN AREA CLINIC 1 | Facility: CLINIC | Age: 72
End: 2022-08-19

## 2022-08-19 DIAGNOSIS — H16.143: ICD-10-CM

## 2022-08-19 DIAGNOSIS — H20.012: ICD-10-CM

## 2022-08-19 DIAGNOSIS — B00.52: ICD-10-CM

## 2022-08-19 DIAGNOSIS — H04.123: ICD-10-CM

## 2022-08-19 PROCEDURE — 92012 INTRM OPH EXAM EST PATIENT: CPT

## 2022-08-19 ASSESSMENT — TONOMETRY
OD_IOP_MMHG: 12
OS_IOP_MMHG: 12

## 2022-08-19 ASSESSMENT — VISUAL ACUITY
OD_SC: 20/20
OS_SC: 20/30

## 2022-08-19 NOTE — PATIENT DISCUSSION
(CD 0.80 OU) IOP stable, continue Latanoprost QHS OD, continue to hold OS. Condition was discussed with patient and patient understands. Will continue to monitor patient for any progression in condition. Patient was advised to call us with any problems, questions, or concerns.

## 2022-08-19 NOTE — PATIENT DISCUSSION
(CD 0.80 OU) IOP stable, continue Latanoprost QHS OU. Condition was discussed with patient and patient understands. Will continue to monitor patient for any progression in condition. Patient was advised to call us with any problems, questions, or concerns.

## 2022-08-26 ENCOUNTER — EMERGENCY VISIT (OUTPATIENT)
Dept: URBAN - METROPOLITAN AREA CLINIC 2 | Facility: CLINIC | Age: 72
End: 2022-08-26

## 2022-08-26 DIAGNOSIS — H04.123: ICD-10-CM

## 2022-08-26 DIAGNOSIS — H16.143: ICD-10-CM

## 2022-08-26 PROCEDURE — 92012 INTRM OPH EXAM EST PATIENT: CPT

## 2022-08-26 ASSESSMENT — VISUAL ACUITY
OS_CC: 20/40
OS_CC: 20/40
OD_CC: 20/40
OU_CC: 20/20-1
OD_CC: 20/25
OU_CC: 20/20-1

## 2022-08-26 ASSESSMENT — TONOMETRY
OS_IOP_MMHG: 13
OD_IOP_MMHG: 12

## 2022-08-26 NOTE — PATIENT DISCUSSION
Diagnosis discussed in detail with the patient today. Advised the patient to D/c the use of latanoprost at this time OS due to it possibly worsening condition. Will have patient start on timolol 0.5% BID OS(erx'd), and continue the use of Zirgan TID OS until bottle is gone. Patient denies any respiratory issues at this time. F/u 1 week/ 1-0.

## 2022-08-29 ENCOUNTER — EMERGENCY VISIT (OUTPATIENT)
Dept: URBAN - METROPOLITAN AREA CLINIC 1 | Facility: CLINIC | Age: 72
End: 2022-08-29

## 2022-08-29 DIAGNOSIS — H16.143: ICD-10-CM

## 2022-08-29 DIAGNOSIS — H04.123: ICD-10-CM

## 2022-08-29 PROCEDURE — 92012 INTRM OPH EXAM EST PATIENT: CPT

## 2022-08-29 ASSESSMENT — VISUAL ACUITY
OD_CC: 20/20
OS_CC: 20/30

## 2022-08-29 ASSESSMENT — TONOMETRY: OD_IOP_MMHG: 13

## 2022-08-29 NOTE — PATIENT DISCUSSION
Diagnosis discussed in detail with the patient today. Continue Zirgan TID OS until seen. Patient denies any respiratory issues at this time. F/u 1 week/ 1-0.

## 2022-09-02 ASSESSMENT — TONOMETRY
OS_IOP_MMHG: 14
OD_IOP_MMHG: 14

## 2022-09-02 ASSESSMENT — VISUAL ACUITY
OS_CC: 20/40
OD_CC: 20/20

## 2022-10-24 ENCOUNTER — COMPREHENSIVE EXAM (OUTPATIENT)
Dept: URBAN - METROPOLITAN AREA CLINIC 1 | Facility: CLINIC | Age: 72
End: 2022-10-24

## 2022-10-24 DIAGNOSIS — H20.012: ICD-10-CM

## 2022-10-24 DIAGNOSIS — Z96.1: ICD-10-CM

## 2022-10-24 DIAGNOSIS — H04.123: ICD-10-CM

## 2022-10-24 DIAGNOSIS — H40.1232: ICD-10-CM

## 2022-10-24 DIAGNOSIS — H16.143: ICD-10-CM

## 2022-10-24 DIAGNOSIS — H17.12: ICD-10-CM

## 2022-10-24 PROCEDURE — 92133 CPTRZD OPH DX IMG PST SGM ON: CPT

## 2022-10-24 PROCEDURE — 99214 OFFICE O/P EST MOD 30 MIN: CPT

## 2022-10-24 ASSESSMENT — TONOMETRY
OS_IOP_MMHG: 14
OD_IOP_MMHG: 10

## 2022-10-24 ASSESSMENT — VISUAL ACUITY
OD_CC: 20/20-1
OS_CC: 20/25-2

## 2022-10-24 NOTE — PATIENT DISCUSSION
(CD 0.80 OU) OCT shows no progression OU. IOP stable, Patient had D/c gtts OU Due to Ulcer OS. Patient to restart  Latanoprost QHS OD, continue to hold OS. Condition was discussed with patient and patient understands. Will continue to monitor patient for any progression in condition. Patient was advised to call us with any problems, questions, or concerns.

## 2023-01-05 ENCOUNTER — EMERGENCY VISIT (OUTPATIENT)
Dept: URBAN - METROPOLITAN AREA CLINIC 1 | Facility: CLINIC | Age: 73
End: 2023-01-05

## 2023-01-05 DIAGNOSIS — B00.52: ICD-10-CM

## 2023-01-05 PROCEDURE — 99213 OFFICE O/P EST LOW 20 MIN: CPT

## 2023-01-05 RX ORDER — GANCICLOVIR 1.5 MG/G: 1 GEL OPHTHALMIC

## 2023-01-05 ASSESSMENT — VISUAL ACUITY
OS_PH: 20/25-2
OD_CC: 20/20-1
OS_CC: J4
OD_CC: J2
OS_CC: 20/60-2

## 2023-01-05 ASSESSMENT — TONOMETRY
OS_IOP_MMHG: 13
OD_IOP_MMHG: 13

## 2023-01-05 NOTE — PATIENT DISCUSSION
Begin Zirgan QID OS (Erx'd). Restart Valtrex PO BID (Pt has supply, can Rx if calls) Diagnosis discussed in detail with the patient today. Patient denies any respiratory issues at this time.

## 2023-01-05 NOTE — PATIENT DISCUSSION
(CD 0.80 OU) IOP stable, Patient had D/c gtts OU Due to Ulcer OS. Patient to restart  Latanoprost QHS OD, continue to hold OS. Condition was discussed with patient and patient understands. Will continue to monitor patient for any progression in condition. Patient was advised to call us with any problems, questions, or concerns.

## 2023-01-19 ENCOUNTER — FOLLOW UP (OUTPATIENT)
Dept: URBAN - METROPOLITAN AREA CLINIC 1 | Facility: CLINIC | Age: 73
End: 2023-01-19

## 2023-01-19 DIAGNOSIS — B00.52: ICD-10-CM

## 2023-01-19 DIAGNOSIS — H20.012: ICD-10-CM

## 2023-01-19 PROCEDURE — 99213 OFFICE O/P EST LOW 20 MIN: CPT

## 2023-01-19 ASSESSMENT — TONOMETRY
OD_IOP_MMHG: 14
OS_IOP_MMHG: 14

## 2023-01-19 ASSESSMENT — VISUAL ACUITY
OD_CC: 20/20-1
OS_CC: 20/25-2

## 2023-01-19 NOTE — PATIENT DISCUSSION
D/c Zirgan QID OS. Decrease Valtrex to PO Qdaily (Pt has supply, can Rx if calls) Diagnosis discussed in detail with the patient today. Patient denies any respiratory issues at this time.

## 2023-04-17 ENCOUNTER — FOLLOW UP (OUTPATIENT)
Dept: URBAN - METROPOLITAN AREA CLINIC 1 | Facility: CLINIC | Age: 73
End: 2023-04-17

## 2023-04-17 DIAGNOSIS — H40.1232: ICD-10-CM

## 2023-04-17 PROCEDURE — 92083 EXTENDED VISUAL FIELD XM: CPT

## 2023-04-17 PROCEDURE — 99213 OFFICE O/P EST LOW 20 MIN: CPT

## 2023-04-17 ASSESSMENT — VISUAL ACUITY
OD_CC: J1
OD_CC: 20/20
OS_CC: 20/25-1
OS_CC: J1

## 2023-04-17 ASSESSMENT — TONOMETRY
OD_IOP_MMHG: 18
OS_IOP_MMHG: 18

## 2023-09-15 ENCOUNTER — COMPREHENSIVE EXAM (OUTPATIENT)
Dept: URBAN - METROPOLITAN AREA CLINIC 1 | Facility: CLINIC | Age: 73
End: 2023-09-15

## 2023-09-15 DIAGNOSIS — H40.1232: ICD-10-CM

## 2023-09-15 PROCEDURE — 99214 OFFICE O/P EST MOD 30 MIN: CPT

## 2023-09-15 PROCEDURE — 92133 CPTRZD OPH DX IMG PST SGM ON: CPT

## 2023-09-15 RX ORDER — LATANOPROST 50 UG/ML
1 SOLUTION/ DROPS OPHTHALMIC EVERY EVENING
Start: 2023-09-15

## 2023-09-15 ASSESSMENT — VISUAL ACUITY
OS_CC: 20/25-2
OD_CC: J1
OD_CC: 20/20
OS_CC: J1

## 2023-09-15 ASSESSMENT — TONOMETRY
OD_IOP_MMHG: 17
OS_IOP_MMHG: 19

## 2024-04-22 ENCOUNTER — FOLLOW UP (OUTPATIENT)
Dept: URBAN - METROPOLITAN AREA CLINIC 1 | Facility: CLINIC | Age: 74
End: 2024-04-22

## 2024-04-22 DIAGNOSIS — H40.1232: ICD-10-CM

## 2024-04-22 PROCEDURE — 99213 OFFICE O/P EST LOW 20 MIN: CPT

## 2024-04-22 PROCEDURE — 92083 EXTENDED VISUAL FIELD XM: CPT

## 2024-04-22 ASSESSMENT — TONOMETRY
OD_IOP_MMHG: 11
OS_IOP_MMHG: 14

## 2024-04-22 ASSESSMENT — VISUAL ACUITY
OD_CC: 20/20
OS_CC: 20/25

## 2025-03-05 ENCOUNTER — EMERGENCY VISIT (OUTPATIENT)
Age: 75
End: 2025-03-05

## 2025-03-05 DIAGNOSIS — B00.52: ICD-10-CM

## 2025-03-05 PROCEDURE — 99213 OFFICE O/P EST LOW 20 MIN: CPT

## 2025-03-18 ENCOUNTER — FOLLOW UP (OUTPATIENT)
Age: 75
End: 2025-03-18

## 2025-03-18 DIAGNOSIS — B00.52: ICD-10-CM

## 2025-03-18 PROCEDURE — 99213 OFFICE O/P EST LOW 20 MIN: CPT

## 2025-03-28 ENCOUNTER — FOLLOW UP (OUTPATIENT)
Age: 75
End: 2025-03-28

## 2025-03-28 DIAGNOSIS — B00.52: ICD-10-CM

## 2025-03-28 DIAGNOSIS — H40.1232: ICD-10-CM

## 2025-03-28 PROCEDURE — 99213 OFFICE O/P EST LOW 20 MIN: CPT
